# Patient Record
(demographics unavailable — no encounter records)

---

## 2017-03-21 NOTE — EMERGENCY ROOM REPORT
History of Present Illness


General


Chief Complaint:  Upper Respiratory Illness


Source:  Patient


 (Shonna Mcclellan)





Present Illness


HPI


72-year-old female presents to emergency Department complaining of productive 

cough over 2 months.  Patient states that approximately a month and a half ago 

she was prescribed antibiotics however now she states she began that fevers and 

chills over the course of the past 2 days.  Patient states that she was 

diagnosed previously with bronchitis she denies recent travel and reports her 

only ill contact was her grandson.  Patient reports past medical history of 

hypertension denies history of asthma, COPD, smoking, cardiac disease or renal 

disease.  She denies swelling in the lower extremities.  Patient states that 

yesterday she also felt weaker than normal.  Patient denies fall, dizziness, 

dysuria, hematuria, abdominal pain, rashes.  Denies sore throat or runny nose.  

Denies CP, Palpitations, LOC, AMS, dizziness, Changes in Vision, Sensation, 

paresthesias, or a sudden severe headache.


 (Shonna Mcclellan)


Allergies:  


Coded Allergies:  


     No Known Allergies (Unverified , 16)





Patient History


Past Medical History:  see triage record, HTN


Past Surgical History:  none


Pertinent Family History:  none


Last Menstrual Period:  na


Pregnant Now:  No


Immunizations:  UTD


Reviewed Nursing Documentation:  PMH: Agreed, PSxH: Agreed (Shonna Mcclellan)





Nursing Documentation-PMH


Past Medical History:  No History, Except For


 (Shonna Mcclellan)





Review of Systems


All Other Systems:  negative except mentioned in HPI


 (Shonna Mcclellan)





Physical Exam





Vital Signs








  Date Time  Temp Pulse Resp B/P Pulse Ox O2 Delivery O2 Flow Rate FiO2


 


3/21/17 13:47 100.6 106 20 102/63 98 Room Air  








Sp02 EP Interpretation:  reviewed, abnormal - fever 100.6, tachycardic 106 BPM


General Appearance:  no apparent distress, alert, GCS 15, non-toxic


Head:  normocephalic, atraumatic


Eyes:  bilateral eye PERRL, bilateral eye normal inspection


ENT:  hearing grossly normal, normal pharynx, no angioedema, normal voice


Neck:  full range of motion, supple/symm/no masses


Respiratory:  chest non-tender, crackles - inspiratory crackles auscultated at 

the base of the right lung, left lung fields are CTA, speaking full sentences


Cardiovascular #1:  regular rate, rhythm, no edema, normal capillary refill, 

tachycardia - 106BPM


Gastrointestinal:  soft


Rectal:  deferred


Genitourinary:  normal inspection, no CVA tenderness


Musculoskeletal:  back normal, gait/station normal, normal range of motion, non-

tender, no calf tenderness


Neurologic:  alert, oriented x3, responsive, motor strength/tone normal, 

sensory intact, speech normal


Psychiatric:  judgement/insight normal, memory normal, mood/affect normal, no 

suicidal/homicidal ideation


Skin:  normal color, no rash, warm/dry, well hydrated


Lymphatic:  no adenopathy


 (Shonna Mcclellan)





Medical Decision Making


PA Attestation


Dr. Saenz  is my supervising Physician whom patient management has been 

discussed with. 


 (Shonna Mcclellan)


Diagnostic Impression:  


 Primary Impression:  


 Pneumonia


 Qualified Codes:  J18.9 - Pneumonia, unspecified organism


 Additional Impression:  


 Anemia


 Qualified Codes:  D64.9 - Anemia, unspecified


ER Course


72-year-old female presents to emergency Department complaining of productive 

cough over 2 months.  Patient states that approximately a month and a half ago 

she was prescribed antibiotics however now she states she began that fevers and 

chills over the course of the past 2 days.  Patient states that she was 

diagnosed previously with bronchitis she denies recent travel and reports her 

only ill contact was her grandson.  Patient reports past medical history of 

hypertension denies history of asthma, COPD, smoking, cardiac disease or renal 

disease.  She denies swelling in the lower extremities.  Patient states that 

yesterday she also felt weaker than normal.  Patient denies fall, dizziness, 

dysuria, hematuria, abdominal pain, rashes.  Denies sore throat or runny nose.





Ddx considered but are not limited to URI, pneumonia, PE, pulmonary edema, 

bronchitis





Vital signs: Pt. has fever of 100.6, tachycardic 106 bpm


H&PE are most consistent with possible PNA, inspiratory crackles auscultated at 

the base of the right lung, left lung fields are CTA. 





ORDERS: 


-CXR 1 View:  Bilateral pulmonary infiltrates, no atelectasis, no effusions per 

preliminary read by Dr. Saenz


-CBC: WBC's elevated 10.9, hemoglobin 10.9,  rbc's 3.5


-BMP: mild hypochloremia and hyponatremia , Cr 1.1


-UA: pending


-Pro-BNP: 985


-Lactic Acid: pending


-Blood Cultures x 2: Pending


-EK BPM NSR, no Acute ST changes- interpreted by Dr. Saenz


 


ED INTERVENTIONS: 


-Pt placed on cardiac monitoring


-IV Access established


-3grams Unasyn IVPB 








DISPOSITION: at this time pt. will be admitted to Dr. Rocha for Pneumonia.


Dr. Rocha  agreed to admit the pt. and to continue pt. care management.- 

Endorsed to Dr. Rocha at 1547





Labs








Test


  3/21/17


14:24 3/21/17


15:19 3/21/17


15:34


 


White Blood Count


  10.9 K/UL


(4.8-10.8) 


  


 


 


Red Blood Count


  3.56 M/UL


(4.20-5.40) 


  


 


 


Hemoglobin


  10.9 G/DL


(12.0-16.0) 


  


 


 


Hematocrit


  32.7 %


(37.0-47.0) 


  


 


 


Mean Corpuscular Volume 92 FL (80-99)   


 


Mean Corpuscular Hemoglobin


  30.7 PG


(27.0-31.0) 


  


 


 


Mean Corpuscular Hemoglobin


Concent 33.5 G/DL


(32.0-36.0) 


  


 


 


Red Cell Distribution Width


  11.8 %


(11.6-14.8) 


  


 


 


Platelet Count


  311 K/UL


(150-450) 


  


 


 


Mean Platelet Volume


  6.2 FL


(6.5-10.1) 


  


 


 


Neutrophils (%) (Auto)  % (45.0-75.0)   


 


Lymphocytes (%) (Auto)  % (20.0-45.0)   


 


Monocytes (%) (Auto)  % (1.0-10.0)   


 


Eosinophils (%) (Auto)  % (0.0-3.0)   


 


Basophils (%) (Auto)  % (0.0-2.0)   


 


Differential Total Cells


Counted 100 


  


  


 


 


Neutrophils % (Manual) 91 % (45-75)   


 


Lymphocytes % (Manual) 5 % (20-45)   


 


Monocytes % (Manual) 3 % (1-10)   


 


Eosinophils % (Manual) 0 % (0-3)   


 


Basophils % (Manual) 0 % (0-2)   


 


Band Neutrophils 1 % (0-8)   


 


Platelet Estimate Adequate   


 


Platelet Morphology Normal   


 


Red Blood Cell Morphology Normal   


 


Sodium Level


  132 mEQ/L


(135-145) 


  


 


 


Potassium Level


  3.9 mEQ/L


(3.4-4.9) 


  


 


 


Chloride Level


  95 mEQ/L


() 


  


 


 


Carbon Dioxide Level


  22 mEQ/L


(20-30) 


  


 


 


Anion Gap 15 (5-15)   


 


Blood Urea Nitrogen


  20 mg/dL


(7-23) 


  


 


 


Creatinine


  1.1 mg/dL


(0.5-0.9) 


  


 


 


Estimat Glomerular Filtration


Rate  mL/min (>60) 


  


  


 


 


Glucose Level


  140 mg/dL


() 


  


 


 


Calcium Level


  8.6 mg/dL


(8.6-10.2) 


  


 


 


Pro-B-Type Natriuretic Peptide


  985 pg/mL


(0-125) 


  


 








 (Shonna Mcclellan)


ER Course


The patient was seen by physician assistant.  Patient noted have evidence of 

temporal muscle wasting.  Patient's chest x-ray showed bilateral l lung 

infiltrate.  Patient was given IV antibiotics.  Patient started on IV fluids.  

Patient appears to have been losing weight according to her family.  This is 

reportedly do to a recent death of her son.Chest x-ray read by radiology showed 

bilateral diffuse interstitial disease with indeterminate acuity.





Labs








Test


  3/21/17


14:24


 


White Blood Count


  10.9 K/UL


(4.8-10.8)


 


Red Blood Count


  3.56 M/UL


(4.20-5.40)


 


Hemoglobin


  10.9 G/DL


(12.0-16.0)


 


Hematocrit


  32.7 %


(37.0-47.0)


 


Mean Corpuscular Volume 92 FL (80-99) 


 


Mean Corpuscular Hemoglobin


  30.7 PG


(27.0-31.0)


 


Mean Corpuscular Hemoglobin


Concent 33.5 G/DL


(32.0-36.0)


 


Red Cell Distribution Width


  11.8 %


(11.6-14.8)


 


Platelet Count


  311 K/UL


(150-450)


 


Mean Platelet Volume


  6.2 FL


(6.5-10.1)


 


Neutrophils (%) (Auto)  % (45.0-75.0) 


 


Lymphocytes (%) (Auto)  % (20.0-45.0) 


 


Monocytes (%) (Auto)  % (1.0-10.0) 


 


Eosinophils (%) (Auto)  % (0.0-3.0) 


 


Basophils (%) (Auto)  % (0.0-2.0) 


 


Differential Total Cells


Counted 100 


 


 


Neutrophils % (Manual) 91 % (45-75) 


 


Lymphocytes % (Manual) 5 % (20-45) 


 


Monocytes % (Manual) 3 % (1-10) 


 


Eosinophils % (Manual) 0 % (0-3) 


 


Basophils % (Manual) 0 % (0-2) 


 


Band Neutrophils 1 % (0-8) 


 


Platelet Estimate Adequate 


 


Platelet Morphology Normal 


 


Red Blood Cell Morphology Normal 


 


Sodium Level


  132 mEQ/L


(135-145)


 


Potassium Level


  3.9 mEQ/L


(3.4-4.9)


 


Chloride Level


  95 mEQ/L


()


 


Carbon Dioxide Level


  22 mEQ/L


(20-30)


 


Anion Gap 15 (5-15) 


 


Blood Urea Nitrogen


  20 mg/dL


(7-23)


 


Creatinine


  1.1 mg/dL


(0.5-0.9)


 


Estimat Glomerular Filtration


Rate  mL/min (>60) 


 


 


Glucose Level


  140 mg/dL


()


 


Calcium Level


  8.6 mg/dL


(8.6-10.2)








 (Tian Saenz)


EKG Diagnostic Results


EP Interpretation:  interpreted by Dr. Saenz


Rate:  normal - 97 BPM


Rhythm:  NSR


ST Segments:  no acute changes


ASA given to the pt in ED:  No


PA Scribe Text


interpreted by Dr. Saenz


 (Shonna Mcclellan P.AHarsha)


Rate:  normal


Rhythm:  NSR


ST Segments:  no acute changes


 (Tian Saenz)





Last Vital Signs








  Date Time  Temp Pulse Resp B/P Pulse Ox O2 Delivery O2 Flow Rate FiO2


 


3/21/17 13:47 100.6 106 20 102/63 98 Room Air  








 (Shonna Mcclellan P.AHarsha)


Status:  unchanged


 (Tian Saenz)


Disposition:  ADMITTED AS INPATIENT


Condition:  Serious


Referrals:  


NON PHYSICIAN (PCP)











Shonna Mcclellan Mar 21, 2017 14:12


Tian Saenz Mar 21, 2017 15:24

## 2017-03-22 NOTE — CONSULTATION
DATE OF CONSULTATION:  03/22/2017



PULMONARY CONSULTATION



CHIEF COMPLAINT:  Short of breath and cough with blood-streaked

sputum.



HISTORY OF PRESENT ILLNESS:  This 72-year-old woman was treated

several weeks ago with antibiotics for cough and shortness of breath.  No

x-rays available from that visit; however, the patient did not improve and

was recently given antibiotics again.  She became more short of breath and

came to the emergency department.  She has no high fever.  She is coughing

some yellow sputum with blood streaks.  She has no history of asthma,

pneumonia, or tuberculosis.  She has never smoked cigarettes.



PAST MEDICAL HISTORY:  Hypertension, history of osteoporosis, and

possible rheumatoid arthritis.



PAST SURGICAL HISTORY:  She has no history of surgery.



ALLERGIES:  None.



MEDICATIONS:  Reviewed.  She recently got ceftriaxone in the

emergency department.  At home, she is on aspirin, calcium, and Lyrica.



IMMUNIZATIONS:  She had a flu shot this year.



REVIEW OF SYSTEMS:  Otherwise unremarkable.  She has no chills or

sweats.  She is not losing weight.



PHYSICAL EXAMINATION:

GENERAL:  The patient is alert and responds appropriately.

VITAL SIGNS:  Stable.  She is somewhat hypotensive.  There is no

fever.

SKIN:  Warm and dry.

HEENT:  Head is normocephalic.

NECK:  No jugular venous distention.

CHEST:  Has rales bilateral.

CARDIAC:  Rhythm is regular.

ABDOMEN:  Soft and nontender.

EXTREMITIES:  No edema.



DIAGNOSTIC DATA:  Chest x-ray shows bilateral patchy infiltrates.



IMPRESSION:

1. Bilateral pulmonary infiltrates, possible pneumonia, possible

tuberculosis, possible rheumatoid lung disease.

2. History of hypertension with current hypotension.

3. Mild anemia.

4. Malnutrition with albumin 2.7.

5. Chronic kidney disease, stage 2.

6. Mild hyperglycemia.



PLAN:  The patient will be placed in respiratory isolation.  We will

get appropriate tuberculosis studies, antibiotics will be given, and we

will check rheumatoid factor and get a high-resolution CT scan of the

chest.









  ______________________________________________

  Juancarlos Urias M.D.





DR:  CEE

D:  03/22/2017 17:20

T:  03/22/2017 19:39

JOB#:  [id number]

CC:  Juancarlos Urias M.D.; Fax#:  [phone] Kattan, M.D.

## 2017-03-22 NOTE — DIAGNOSTIC IMAGING REPORT
Indication: COUGH



Technique: One view of the chest



Comparison: none



Findings: There is bilateral diffuse next mostly interstitial disease, 

acuity

indeterminate. The pleural spaces are clear.  Heart size is normal



Impression: Bilateral mixed mostly interstitial disease, acuity indeterminate.

## 2017-03-22 NOTE — HISTORY AND PHYSICAL REPORT
DATE OF ADMISSION:  03/21/2017



REASON FOR ADMISSION:  Community-acquired pneumonia.



HISTORY OF PRESENT ILLNESS:  This is a 72-year-old,  female,

presented to the emergency room with several weeks of progressive cough,

congestion and shortness of breath progressing more over the past few days

despite oral antibiotics.  The patient took a course of antibiotics almost

2 months ago, did not improve and because she developed fevers and chills.

In the last couple of days, additional antibiotics were given.  The

patient notes that her only ill contact with the grandson with a cold.

She has not had any recent travel history and there is no known history of

positive PPD.  There is no history of asthma, COPD, or other lung

disease.



PAST MEDICAL HISTORY:  Includes hypertension.



SOCIAL HISTORY:  Negative for smoking, alcohol, or substance abuse.



ALLERGIES:  None known.



MEDICATIONS:  Prior to admission, reviewed and reconciled.



FAMILY HISTORY:  Noncontributory.



REVIEW OF SYSTEMS:  She has had fevers and chills.  She did receive a

flu shot.  There is no history of seizure or stroke.  There is no known

history of diabetes or thyroid disorder.  She does have a history of

hypertension but no history of heart attack or irregular heartbeats.  No

history of rheumatic fever.  There is no history of asthma.  There is no

history of blood clotting or bleeding.  There is no known history of

kidney disease and she denies any change in bowel habits.



PHYSICAL EXAMINATION:

GENERAL:  Thin and frail, in no acute distress.

VITAL SIGNS:  Temperature 100.6 degrees, blood pressure 102/63, pulse

106, and respiratory 20.

HEENT:  Temporal wasting.  Pale conjunctivae.  Anicteric sclerae.

Oropharynx clear.  Mucous membranes dry.

NECK:  Supple.  No jugular venous distention.  No accessory muscle

use.

LUNGS:  Bilateral breath sounds.  Scattered rhonchi.  No wheezing.

CARDIAC:  Regular rhythm.  Rapid rate.  Normal S1 and S2 with a

fourth heart sound.  No murmur.

ABDOMEN:  Soft and nontender.  No guarding.  No rebound.

RECTAL:  Deferred.

NEUROLOGIC:  Nonfocal.

SKIN:  Without rash.



LABORATORY AND DIAGNOSTIC DATA:  White count 10.9 and hemoglobin

10.9.  Lactic acid is 1.2.  Pro-natriuretic peptide is 955.  BUN 20 and

creatinine 1.1.  Sodium 132, potassium 3.9, and bicarbonate 22.



Chest x-ray revealed bilateral interstitial infiltrates.  EKG sinus

rhythm, no acute abnormalities.



IMPRESSION:

1. Pneumonia.

2. Hypertension.

3. Anemia.

4. Hypovolemia.

5. Dehydration.



PLAN:

1. Antibiotics.

2. Respiratory hygiene.

3. Sputum cultures.

4. Empiric antibiotics.

5. DVT prophylaxis.

6. Volume resuscitation.

7. Repeat imaging studies of the lung.

8. We will follow.









  ______________________________________________

  Van Rocha M.D.





DR:  CARLOS

D:  03/22/2017 01:44

T:  03/22/2017 02:07

JOB#:  [id number]

CC:

## 2017-03-23 NOTE — DIAGNOSTIC IMAGING REPORT
Indication: Dyspnea



Technique: Continuous helical transaxial imaging of the chest was obtained from 

the

thoracic inlet to the upper abdomen. No intravenous contrast was administered.

Coronal 2-D reformats were also obtained.



Total Dose length Product (DLP):  485 mGycm



CT Dose Index Volume (CTDIvol):   15 mGy



Comparison: none



Findings: Extensive, patchy groundglass consolidative opacities are present

throughout the lung fields and both upper lower lobes. The single largest area 

of

involvement appears to be the right upper lobe. Findings likely due to 

disseminated

pneumonia. Please correlate clinically. Several circumscribed thin-walled cystic

foci also noted within the lungs. Nature of these structures is unknown but may be

small postinflammatory pneumatoceles. Heart size is normal. There is no pulmonary

venous congestion identified. There is no interstitial disease per se. Trace 

right

pleural effusion is present. Exam is limited by the absence of IV contrast 

material

especially with regard to evaluation for adenopathy which may be present in 

the

subcarinal and hilar regions. This is not for certain. Aorta shows some 

mural

calcification. The visualized part of the upper abdomen is unremarkable.



Impression:



Extensive patchy infiltrates bilaterally.



Suggestion of subcutaneous scanner pneumatoceles.



Question of a subcarinal lymphadenopathy.



Mild atherosclerotic vascular disease



The CT scanner at [facility] is accredited by the [employer] College 

of

Radiology and the scans are performed using protocols designed to limit 

radiation

exposure to as low as reasonably achievable to attain images of sufficient

resolution adequate for diagnostic evaluation.

## 2017-03-23 NOTE — PULMONOLOGY PROGRESS NOTE
Assessment/Plan


Assessment/Plan


1. Bilateral pulmonary infiltrates, possible pneumonia, tuberculosis or 

rheumatoid lung disease.


2. History of hypertension with current hypotension.


3. Mild anemia.


4. Malnutrition with albumin 2.7.


5. Chronic kidney disease, stage 2.


6. Mild hyperglycemia.





feels better


low grade fever


rales


CT with extensive infiltrates and pneumatoceles


await serology, TB tests


cont abx





Subjective


Constitutional:  Reports: fever


Respiratory:  Reports: productive cough


Allergies:  


Coded Allergies:  


     No Known Allergies (Unverified , 2/14/16)





Objective





Last 24 Hour Vital Signs








  Date Time  Temp Pulse Resp B/P Pulse Ox O2 Delivery O2 Flow Rate FiO2


 


3/23/17 16:00 98.2 95 20 89/50 94 Nasal Cannula 2.0 


 


3/23/17 15:48  87 20  99 Nasal Cannula 2.0 


 


3/23/17 15:38  96 18  99 Nasal Cannula 2.0 


 


3/23/17 11:25 98.1 87 18 89/51 96 Nasal Cannula 2.0 


 


3/23/17 11:24  84 20  97 Nasal Cannula 3.0 


 


3/23/17 11:10  91 18  97 Nasal Cannula 3.0 


 


3/23/17 08:00 97.5 68 20 88/50 96 Nasal Cannula 2.0 


 


3/23/17 08:00  116      


 


3/23/17 07:55  86 18  99 Nasal Cannula 2.0 


 


3/23/17 07:45  95 18  88 Room Air  


 


3/23/17 04:00 97.2 77 18 100/56 97 Nasal Cannula 2.0 


 


3/23/17 04:00  71      


 


3/23/17 03:12      Nasal Cannula  


 


3/23/17 03:12      Nasal Cannula  


 


3/23/17 00:00 99.0 75 18 98/57 96 Nasal Cannula 2.0 


 


3/23/17 00:00  84      


 


3/22/17 22:54      Nasal Cannula  


 


3/22/17 22:53      Nasal Cannula  


 


3/22/17 22:47 99.1       


 


3/22/17 20:00  90      


 


3/22/17 20:00 100.6 85 21 95/54 93 Nasal Cannula 2.0 


 


3/22/17 19:30      Nasal Cannula  


 


3/22/17 19:30      Nasal Cannula  

















Intake and Output  


 


 3/22/17 3/23/17





 19:00 07:00


 


Intake Total 940 ml 1250 ml


 


Balance 940 ml 1250 ml


 


  


 


Intake Oral 240 ml 450 ml


 


IV Total 700 ml 800 ml


 


# Voids 1 3


 


# Bowel Movements  1








General Appearance:  no acute distress


Respiratory/Chest:  crackles/rales


Cardiovascular:  normal rate





Microbiology








 Date/Time


Source Procedure


Growth Status


 


 


 3/21/17 15:19


Blood Blood Culture - Preliminary


NO GROWTH AFTER 24 HOURS Resulted


 


 3/21/17 15:09


Blood Blood Culture - Preliminary


NO GROWTH AFTER 24 HOURS Resulted


 


 3/21/17 15:34


Urine,Clean Catch Urine Culture - Preliminary


Mixed Gram Positive Organism Resulted








Laboratory Tests


3/23/17 04:25: M. tuberculosis Complex DNA (PCR) [Pending]


3/23/17 06:50: 


Anti-Nuclear Antibody Screen [Pending], TB Test (T-Spot) [Pending], TB Test Nil 

Control (T-Spot) [Pending], TB Test Panel A (T-Spot) [Pending], TB Test Panel B 

(T-Spot) [Pending], TB Test Positive Control (T-Spot) [Pending]





Current Medications








 Medications


  (Trade)  Dose


 Ordered  Sig/Naman


 Route


 PRN Reason  Start Time


 Stop Time Status Last Admin


Dose Admin


 


 Acetaminophen


  (Tylenol)  650 mg  Q4H  PRN


 ORAL


 Mild Pain (Pain Scale 1-3)  3/21/17 17:45


 4/20/17 17:44  3/21/17 19:07


 


 


 Albuterol/


 Ipratropium


  (DuoNeb


 0.5-3(2.5)mg/3ml)  3 ml  Q4HRT


 HHN


   3/21/17 18:30


 3/26/17 18:29  3/23/17 15:41


 


 


 Azithromycin 250


 mg  250 mg  Q24H


 ORAL


   3/23/17 18:00


 3/29/17 17:59   


 


 


 Ceftriaxone


 Sodium/Dextrose


  (Rocephin/D5W)  55 ml @ 


 110 mls/hr  Q24H


 IVPB


   3/22/17 19:00


 3/29/17 18:59  3/22/17 21:23


 


 


 Dextrose     STAT  PRN


 IV


 Hypoglycemia  3/21/17 20:45


 4/20/17 20:44   


 


 


 Guaifenesin/


 Codeine Phosphate


  (Robitussin with


 codeine)  5 ml  Q4H  PRN


 ORAL


 For Cough  3/21/17 17:45


 4/20/17 17:44   


 


 


 Heparin Sodium


  (Porcine)


  (Heparin 5000


 units/ml)  5,000 units  EVERY 12  HOURS


 SUBQ


   3/22/17 21:00


 4/21/17 20:59  3/23/17 09:07


 


 


 Sodium Chloride


  (Sodium Chloride


 1000ml bag)  1,000 ml @ 


 100 mls/hr  Q10H


 IV


   3/22/17 01:45


 4/21/17 01:44  3/23/17 09:04


 

















BONNIE PINA Mar 23, 2017 17:36

## 2017-03-24 NOTE — PROGRESS NOTE
DATE:  03/23/2017



INTERNAL MEDICINE PROGRESS NOTE



SUBJECTIVE:  The patient remains in respiratory isolation.  She is

not short of breath.  Cough has decreased.  She has low-grade fevers to

100.6.



OBJECTIVE:

VITAL SIGNS:  Blood pressure 89/50, heart rate 95, respiratory rate

20, and oxygen saturation is 94% to 99% on two liters.

LUNGS:  Bilateral breath sounds.  Few rales.

HEART:  Regular rhythm and rate.  Normal S1 and S2.

ABDOMEN:  Soft.



CT scan revealed extensive infiltrates and pneumatoceles.



IMPRESSION:

1. Pulmonary infiltrates, possible pneumonia, possible rheumatoid lung,

rule out tuberculosis.

2. Persistent low range blood pressure, asymptomatic.

3. Moderate protein-calorie malnutrition.

4. Anemia.

5. Chronic kidney disease.

6. Elevated sedimentation rate.



PLAN:

1. Await cultures.

2. Empiric antibiotics.

3. Respiratory hygiene.

4. Intravenous fluids.

5. Check cortisol level.

6. DVT prophylaxis.









  ______________________________________________

  Van Rocha M.D. DR:  DANIELA

D:  03/23/2017 23:59

T:  03/24/2017 02:24

JOB#:  [id number]

CC:

## 2017-03-24 NOTE — PULMONOLOGY PROGRESS NOTE
Assessment/Plan


Assessment/Plan


1. Bilateral pulmonary infiltrates, possible pneumonia, tuberculosis or 

rheumatoid lung disease.


2. History of hypertension with current hypotension.


3. Mild anemia.


4. Malnutrition with albumin 2.7.


5. Chronic kidney disease, stage 2.


6. Mild hyperglycemia.





feels better


AFB neg x 1


TB spot, DNA pdg


RA titer +; prob Rheumatoid lung with pneumonia


TB less likely


cont abx





Subjective


Constitutional:  Reports: no symptoms


Respiratory:  Reports: hemoptysis - better, productive cough


Allergies:  


Coded Allergies:  


     No Known Allergies (Unverified , 2/14/16)





Objective





Last 24 Hour Vital Signs








  Date Time  Temp Pulse Resp B/P Pulse Ox O2 Delivery O2 Flow Rate FiO2


 


3/24/17 15:54  98 16  99 Nasal Cannula 2.0 


 


3/24/17 15:40  109 18  98 Nasal Cannula 2.0 


 


3/24/17 12:01 98.1 85 15 100/57 99 Nasal Cannula  


 


3/24/17 11:58  97 16  99 Nasal Cannula 2.0 


 


3/24/17 11:48  86 16  98 Nasal Cannula 2.0 


 


3/24/17 08:20 97.7 103 18 91/50 98 Room Air  


 


3/24/17 07:46  96 16  99 Nasal Cannula 2.0 


 


3/24/17 07:36  98 16  98 Nasal Cannula 2.0 


 


3/24/17 04:00 97.3 99 18 114/65 97 Nasal Cannula 2.0 


 


3/24/17 03:00      Nasal Cannula 2.0 28


 


3/24/17 03:00      Nasal Cannula 2.0 28


 


3/24/17 00:00 98.8 97 20 104/63 96 Nasal Cannula 2.0 


 


3/23/17 23:07      Nasal Cannula 2.0 28


 


3/23/17 23:06      Nasal Cannula 2.0 28


 


3/23/17 20:00 97.7 90 18 91/48 96 Nasal Cannula 2.0 


 


3/23/17 19:00  89 14  99 Nasal Cannula 2.0 28


 


3/23/17 19:00      Nasal Cannula 2.0 28

















Intake and Output  


 


 3/23/17 3/24/17





 19:00 07:00


 


Intake Total 840 ml 570 ml


 


Balance 840 ml 570 ml


 


  


 


Intake Oral 240 ml 240 ml


 


IV Total 600 ml 330 ml


 


# Voids 2 3


 


# Bowel Movements 1 








General Appearance:  no acute distress


Respiratory/Chest:  crackles/rales


Cardiovascular:  normal rate





Microbiology








 Date/Time


Source Procedure


Growth Status


 


 


 3/23/17 04:25


Sputum AFB Specimen Processing Tissue - Final Resulted





 3/23/17 04:25


Sputum Acid Fast Bacilli Smear - Final Resulted


 


 3/23/17 04:25


Sputum Acid Fast Bacilli Culture


Pending Resulted





 3/22/17 14:50


Sputum Gram Stain - Final Resulted


 


 3/22/17 14:50 Sputum Culture - Preliminary


Candida Albicans Resulted








Laboratory Tests


3/24/17 06:35: 


White Blood Count 5.3, Red Blood Count 2.74L, Hemoglobin 8.3L, Hematocrit 25.6L

, Mean Corpuscular Volume 93, Mean Corpuscular Hemoglobin 30.4, Mean 

Corpuscular Hemoglobin Concent 32.6, Red Cell Distribution Width 11.9, Platelet 

Count 252, Mean Platelet Volume 6.3L, Neutrophils (%) (Auto) 74.0, Lymphocytes (

%) (Auto) 15.5L, Monocytes (%) (Auto) 9.7, Eosinophils (%) (Auto) 0.5, 

Basophils (%) (Auto) 0.3, Sodium Level 139, Potassium Level 3.6, Chloride Level 

103, Carbon Dioxide Level 22, Anion Gap 14, Blood Urea Nitrogen 9, Creatinine 

0.8, Estimat Glomerular Filtration Rate , Glucose Level 102, Calcium Level 8.3L

, Magnesium Level 1.8, Total Bilirubin 0.4, Aspartate Amino Transf (AST/SGOT) 16

, Alanine Aminotransferase (ALT/SGPT) 6, Alkaline Phosphatase 67, Total Protein 

6.3L, Albumin 2.5L, Globulin 3.8, Albumin/Globulin Ratio 0.6L, Cortisol [Pending

]





Current Medications








 Medications


  (Trade)  Dose


 Ordered  Sig/Naman


 Route


 PRN Reason  Start Time


 Stop Time Status Last Admin


Dose Admin


 


 Acetaminophen


  (Tylenol)  650 mg  Q4H  PRN


 ORAL


 Mild Pain (Pain Scale 1-3)  3/23/17 21:45


 4/22/17 21:44   


 


 


 Albuterol/


 Ipratropium 3 ml  3 ml  Q4HRT


 HHN


   3/23/17 19:00


 3/28/17 18:59  3/24/17 15:43


 


 


 Azithromycin


  (Zithromax)  250 mg  Q24H


 ORAL


   3/24/17 18:00


 3/28/17 17:59   


 


 


 Ceftriaxone


 Sodium/Dextrose


  (Rocephin/D5W)  55 ml @ 


 110 mls/hr  Q24H


 IVPB


   3/23/17 19:00


 3/29/17 18:59  3/23/17 19:35


 


 


 Dextrose


  (Dextrose 50%)    STAT  PRN


 IV


 Hypoglycemia  3/23/17 20:45


 4/22/17 20:44   


 


 


 Guaifenesin/


 Codeine Phosphate


  (Robitussin with


 codeine)  5 ml  Q4H  PRN


 ORAL


 For Cough  3/23/17 21:45


 4/22/17 21:44   


 


 


 Heparin Sodium


  (Porcine)


  (Heparin 5000


 units/ml)  5,000 units  EVERY 12  HOURS


 SUBQ


   3/23/17 21:00


 4/22/17 20:59  3/24/17 09:33


 


 


 Sodium Chloride


  (Sodium Chloride


 1000ml bag)  1,000 ml @ 


 75 mls/hr  [id number]


 IV


   3/24/17 19:00


 4/23/17 18:59  3/24/17 04:44


 

















BONNIE PINA 24, 2017 16:08

## 2017-03-24 NOTE — PROGRESS NOTE
DATE:  03/22/2017



INTERNAL MEDICINE PROGRESS NOTE



LATE ENTRY



SUBJECTIVE:  The patient was seen and evaluated.  Case was reviewed

with the pulmonologist, Dr. Urias.  The patient is less congested, but

still has cough.  She had low blood pressure readings all evening and

required fluid boluses.



OBJECTIVE:

VITAL SIGNS:  Blood pressure 85/47, pulse 98, respiratory rate 19,

afebrile, and oxygen saturation on 2 L 99%.

HEENT:  Oropharynx clear with no thrush.

NECK:  Supple with no adenopathy.

LUNGS:  With rales bilaterally.

CARDIAC:  Regular rhythm and rate.  Normal S1 and S2 with no

murmur.

EXTREMITIES:  No edema.

ABDOMEN:  Soft and benign.



IMPRESSION:

1. Pulmonary infiltrates, possible pneumonia.

2. Hypertension, now with low heart rate.  Blood pressure suggesting

shock.

3. Anemia.

4. Moderate protein-calorie malnutrition.

5. Chronic kidney disease.



PLAN:  Antimicrobials.  Respiratory isolation.  Pending tuberculosis

studies.  CT scan of the chest _____ pulmonologist.  Protein supplement.

Volume support.  Holding of any antihypertensive therapy at this time.  No

need for pressors presently.









  ______________________________________________

  Van Rocha M.D. DR:  ALEXANDRE

D:  03/23/2017 23:56

T:  03/24/2017 01:57

JOB#:  [id number]

CC:

## 2017-03-25 NOTE — PROGRESS NOTE
DATE:  03/24/2017



SUBJECTIVE:  The patient is less short of breath, but still quite

weak.



OBJECTIVE:

VITAL SIGNS:  Blood pressure 91/50 to 114/65, heart rate 86 to 109,

and respiratory rate 16 to 18.  The patient remains afebrile.  Oxygen

saturation 98 to 99% on 2 liters.

LUNGS:  Coarse rhonchi.

CARDIAC:  Regular rhythm and rate.  Normal S1 and S2.

ABDOMEN:  Soft.

EXTREMITIES:  No edema.



LABORATORY DATA:  TB test pending.  AFB smear negative x1.  RA titer

positive.



IMPRESSION:

1. 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

2. Hypovolemic shock, recovering.

3. Severe protein-calorie malnutrition.

4. Chronic kidney disease.



LABORATORY DATA:  White count 5.3 and hemoglobin 8.3.  Potassium 3.6,

BUN 9, creatinine 0.8, and albumin 2.5.  _____ pending.



PLAN:

1. Protein supplement.

2. Empiric antibiotics.

3. Await final cultures.

4. Anemia panel.

5. Volume support.

6. Mobilize.









  ______________________________________________

  Van Rocha M.D.





DR:  MYRNA

D:  03/24/2017 23:56

T:  03/25/2017 02:42

JOB#:  [id number]

CC:

## 2017-03-25 NOTE — PULMONOLOGY PROGRESS NOTE
Assessment/Plan


Assessment/Plan


1. Bilateral pulmonary infiltrates, possible pneumonia, tuberculosis or 

rheumatoid lung disease.


2. History of hypertension with current hypotension.


3. Mild anemia.


4. Malnutrition with albumin 2.7.


5. Chronic kidney disease, stage 2.


6. Mild hyperglycemia.





continues to feels better


AFB neg x 2


TB spot, DNA pdg


RA titer +; prob Rheumatoid lung with pneumonia


TB less likely


cont abx


CXR monday





Subjective


Constitutional:  Reports: no symptoms


Respiratory:  Reports: dry cough, shortness of breath


Cardiovascular:  Reports: no symptoms


Gastrointestinal/Abdominal:  Reports: no symptoms


Genitourinary:  Reports: no symptoms


Allergies:  


Coded Allergies:  


     No Known Allergies (Unverified , 2/14/16)


Subjective


no new events


 no cp nv 


scant hemoptysis noted by patient


tolerating po


remains on supple o2


no fever noted


oob


still weak





Objective





Last 24 Hour Vital Signs








  Date Time  Temp Pulse Resp B/P Pulse Ox O2 Delivery O2 Flow Rate FiO2


 


3/25/17 16:28 99.5 99 16 117/66 98 Nasal Cannula  


 


3/25/17 14:43  92 16  99 Nasal Cannula 2.0 28


 


3/25/17 14:38  97 16  99 Nasal Cannula 2.0 28


 


3/25/17 14:38        28


 


3/25/17 11:30 99.9 98 16 100/54 98 Room Air  


 


3/25/17 11:20  88 16  98 Nasal Cannula 2.0 28


 


3/25/17 11:15  85 16  97 Nasal Cannula 2.0 28


 


3/25/17 11:15        28


 


3/25/17 08:21 97.9 109 18 90/51 100 Nasal Cannula  


 


3/25/17 07:30        28


 


3/25/17 07:30  90 16  95 Nasal Cannula 2.0 28


 


3/25/17 07:30  85 16  99 Nasal Cannula 2.0 28


 


3/25/17 04:00 99.3 90 20 95/44 94 Nasal Cannula 2.0 


 


3/25/17 03:14      Nasal Cannula 2.0 28


 


3/25/17 03:13      Nasal Cannula 2.0 28


 


3/25/17 00:11 99.7       


 


3/25/17 00:00 99.7 117 18 100/54 96 Nasal Cannula 2.0 


 


3/24/17 23:35  98 16  100 Nasal Cannula 2.0 


 


3/24/17 23:35  106 18  97 Nasal Cannula 2.0 


 


3/24/17 23:13 100.4       


 


3/24/17 20:02  93 18  96 Nasal Cannula 2.0 


 


3/24/17 20:02  92 16  99 Nasal Cannula 2.0 


 


3/24/17 20:00 101.1 92 18 113/61 96 Room Air  

















Intake and Output  


 


 3/24/17 3/25/17





 19:00 07:00


 


Intake Total 1375 ml 1277.5 ml


 


Balance 1375 ml 1277.5 ml


 


  


 


Intake Oral 1000 ml 360 ml


 


IV Total 375 ml 917.5 ml


 


# Voids 2 5








General Appearance:  WD/WN


HEENT:  atraumatic


Respiratory/Chest:  lungs clear, no respiratory distress


Cardiovascular:  normal peripheral pulses, regular rhythm


Abdomen:  soft, non tender, no organomegaly


Extremities:  no cyanosis


Skin:  no rash


Neurologic/Psychiatric:  abnormal gait, alert, oriented x 3


Lymphatic:  no neck adenopathy





Microbiology








 Date/Time


Source Procedure


Growth Status


 


 


 3/24/17 05:00


Sputum AFB Specimen Processing Tissue - Final Resulted





 3/24/17 05:00


Sputum Acid Fast Bacilli Smear - Final Resulted


 


 3/24/17 05:00


Sputum Acid Fast Bacilli Culture


Pending Resulted





 3/23/17 04:25


Sputum AFB Specimen Processing Tissue - Final Resulted





 3/23/17 04:25


Sputum Acid Fast Bacilli Smear - Final Resulted


 


 3/23/17 04:25


Sputum Acid Fast Bacilli Culture


Pending Resulted








Laboratory Tests


3/25/17 01:15: M. tuberculosis Complex DNA (PCR) [Pending]


3/25/17 07:20: 


White Blood Count 6.5, Red Blood Count 2.88L, Hemoglobin 8.6L, Hematocrit 27.0L

, Mean Corpuscular Volume 94, Mean Corpuscular Hemoglobin 30.0, Mean 

Corpuscular Hemoglobin Concent 32.0, Red Cell Distribution Width 12.2, Platelet 

Count 267, Mean Platelet Volume 6.1L, Neutrophils (%) (Auto) 68.9, Lymphocytes (

%) (Auto) 20.8, Monocytes (%) (Auto) 9.5, Eosinophils (%) (Auto) 0.3, Basophils 

(%) (Auto) 0.4, Iron Level 27L, Total Iron Binding Capacity 178L, Percent Iron 

Saturation 15, Unsaturated Iron Binding 151, Vitamin B12 Level 664, Folate [

Pending]


3/25/17 09:50: Stool Occult Blood [Pending]





Current Medications








 Medications


  (Trade)  Dose


 Ordered  Sig/Naman


 Route


 PRN Reason  Start Time


 Stop Time Status Last Admin


Dose Admin


 


 Acetaminophen


  (Tylenol)  650 mg  Q4H  PRN


 ORAL


 Mild Pain (Pain Scale 1-3)  3/23/17 21:45


 4/22/17 21:44  3/24/17 23:12


 


 


 Albuterol/


 Ipratropium 3 ml  3 ml  Q4HRT


 HHN


   3/23/17 19:00


 3/28/17 18:59  3/25/17 14:37


 


 


 Azithromycin


  (Zithromax)  250 mg  Q24H


 ORAL


   3/24/17 18:00


 3/28/17 17:59  3/24/17 19:49


 


 


 Ceftriaxone


 Sodium/Dextrose


  (Rocephin/D5W)  55 ml @ 


 110 mls/hr  Q24H


 IVPB


   3/23/17 19:00


 3/29/17 18:59  3/24/17 19:49


 


 


 Dextrose


  (Dextrose 50%)    STAT  PRN


 IV


 Hypoglycemia  3/23/17 20:45


 4/22/17 20:44   


 


 


 Guaifenesin/


 Codeine Phosphate


  (Robitussin with


 codeine)  5 ml  Q4H  PRN


 ORAL


 For Cough  3/23/17 21:45


 4/22/17 21:44   


 


 


 Heparin Sodium


  (Porcine)


  (Heparin 5000


 units/ml)  5,000 units  EVERY 12  HOURS


 SUBQ


   3/23/17 21:00


 4/22/17 20:59  3/25/17 09:19


 


 


 Sodium Chloride


  (Sodium Chloride


 1000ml bag)  1,000 ml @ 


 75 mls/hr  [id number]


 IV


   3/24/17 19:00


 4/23/17 18:59  3/25/17 09:20


 

















OLGA BECK DO Mar 25, 2017 17:23

## 2017-03-26 NOTE — PULMONOLOGY PROGRESS NOTE
Assessment/Plan


Assessment/Plan


1. Bilateral pulmonary infiltrates, possible pneumonia,  rheumatoid lung 

disease doubt tuberculosis .


2. History of hypertension with current hypotension.


3. Mild anemia.


4. Malnutrition with albumin 2.7.


5. Chronic kidney disease, stage 2.


6. Mild hyperglycemia.





continues to feels better


AFB neg x 3, can dc isolation


TB spot, DNA pdg


RA titer +; prob Rheumatoid lung with pneumonia


TB less likely


cont abx


CXR monday





Subjective


Constitutional:  Reports: no symptoms


HEENT:  Repors: no symptoms


Respiratory:  Reports: no symptoms


Cardiovascular:  Reports: no symptoms


Gastrointestinal/Abdominal:  Reports: no symptoms


Neurologic:  Reports: no symptoms


Skin:  Reports: no symptoms


Hematologic:  Reports: no symptoms


Allergies:  


Coded Allergies:  


     No Known Allergies (Unverified , 2/14/16)


Subjective


no new events


 no cp nv 


no  hemoptysis noted by patient


tolerating po


remains on supple o2


no fever noted


oob


still weak


afb negative 3





Objective





Last 24 Hour Vital Signs








  Date Time  Temp Pulse Resp B/P Pulse Ox O2 Delivery O2 Flow Rate FiO2


 


3/26/17 16:00 99.9 86 20 122/65 97 Nasal Cannula 2.0 


 


3/26/17 15:05      Nasal Cannula 2.0 28


 


3/26/17 15:05      Nasal Cannula 2.0 28


 


3/26/17 11:55 99.1 83 22 106/62 97 Room Air  


 


3/26/17 11:15      Nasal Cannula 2.0 28


 


3/26/17 11:15      Nasal Cannula 2.0 28


 


3/26/17 08:20 97.9 89 20 111/67 97 Room Air  


 


3/26/17 07:20  89 16  99 Nasal Cannula 2.0 28


 


3/26/17 07:20  94 16  96 Nasal Cannula 2.0 28


 


3/26/17 04:00 97.6 76 17 106/62 100 Room Air  


 


3/26/17 02:34      Nasal Cannula  


 


3/26/17 02:30      Nasal Cannula  


 


3/26/17 00:00 97.0 80 16 110/59 100 Room Air  


 


3/25/17 23:00      Nasal Cannula  


 


3/25/17 20:29  89 16  99 Nasal Cannula 2.0 28


 


3/25/17 20:26  92 16  98 Nasal Cannula 2.0 28

















Intake and Output  


 


 3/25/17 3/26/17





 19:00 07:00


 


Intake Total 1887.5 ml 375 ml


 


Balance 1887.5 ml 375 ml


 


  


 


Intake Oral 1450 ml 


 


IV Total 437.5 ml 375 ml


 


# Voids 2 4


 


# Bowel Movements 2 








General Appearance:  WD/WN


HEENT:  atraumatic, anicteric


Respiratory/Chest:  lungs clear, normal breath sounds


Cardiovascular:  normal rate, regularly irregular


Abdomen:  soft, non tender, no organomegaly


Extremities:  no clubbing


Neurologic/Psychiatric:  abnormal gait, oriented x 3





Microbiology








 Date/Time


Source Procedure


Growth Status


 


 


 3/25/17 01:15


Sputum Not Specified AFB Specimen Processing Tissue - Final Resulted





 3/25/17 01:15


Sputum Not Specified Acid Fast Bacilli Smear - Final Resulted


 


 3/25/17 01:15


Sputum Not Specified Acid Fast Bacilli Culture


Pending Resulted





 3/24/17 05:00


Sputum AFB Specimen Processing Tissue - Final Resulted





 3/24/17 05:00


Sputum Acid Fast Bacilli Smear - Final Resulted


 


 3/24/17 05:00


Sputum Acid Fast Bacilli Culture


Pending Resulted











Current Medications








 Medications


  (Trade)  Dose


 Ordered  Sig/Naman


 Route


 PRN Reason  Start Time


 Stop Time Status Last Admin


Dose Admin


 


 Acetaminophen


  (Tylenol)  650 mg  Q4H  PRN


 ORAL


 Mild Pain (Pain Scale 1-3)  3/23/17 21:45


 4/22/17 21:44  3/24/17 23:12


 


 


 Albuterol/


 Ipratropium


  (DuoNeb


 0.5-3(2.5)mg/3ml)  3 ml  Q4HRT


 HHN


   3/23/17 19:00


 3/28/17 18:59  3/26/17 07:20


 


 


 Azithromycin


  (Zithromax)  250 mg  Q24H


 ORAL


   3/24/17 18:00


 3/28/17 17:59  3/26/17 19:34


 


 


 Ceftriaxone


 Sodium/Dextrose


  (Rocephin/D5W)  55 ml @ 


 110 mls/hr  Q24H


 IVPB


   3/23/17 19:00


 3/29/17 18:59  3/26/17 19:34


 


 


 Dextrose


  (Dextrose 50%)    STAT  PRN


 IV


 Hypoglycemia  3/23/17 20:45


 4/22/17 20:44   


 


 


 Guaifenesin/


 Codeine Phosphate


  (Robitussin with


 codeine)  5 ml  Q4H  PRN


 ORAL


 For Cough  3/23/17 21:45


 4/22/17 21:44   


 


 


 Heparin Sodium


  (Porcine)


  (Heparin 5000


 units/ml)  5,000 units  EVERY 12  HOURS


 SUBQ


   3/23/17 21:00


 4/22/17 20:59  3/26/17 08:32


 











Current Medications








 Medications


  (Trade)  Dose


 Ordered  Sig/Naman


 Route


 PRN Reason  Start Time


 Stop Time Status Last Admin


Dose Admin


 


 Acetaminophen


  (Tylenol)  650 mg  Q4H  PRN


 ORAL


 Mild Pain (Pain Scale 1-3)  3/23/17 21:45


 4/22/17 21:44  3/24/17 23:12


 


 


 Albuterol/


 Ipratropium


  (DuoNeb


 0.5-3(2.5)mg/3ml)  3 ml  Q4HRT


 HHN


   3/23/17 19:00


 3/28/17 18:59  3/26/17 07:20


 


 


 Azithromycin


  (Zithromax)  250 mg  Q24H


 ORAL


   3/24/17 18:00


 3/28/17 17:59  3/26/17 19:34


 


 


 Ceftriaxone


 Sodium/Dextrose


  (Rocephin/D5W)  55 ml @ 


 110 mls/hr  Q24H


 IVPB


   3/23/17 19:00


 3/29/17 18:59  3/26/17 19:34


 


 


 Dextrose


  (Dextrose 50%)    STAT  PRN


 IV


 Hypoglycemia  3/23/17 20:45


 4/22/17 20:44   


 


 


 Guaifenesin/


 Codeine Phosphate


  (Robitussin with


 codeine)  5 ml  Q4H  PRN


 ORAL


 For Cough  3/23/17 21:45


 4/22/17 21:44   


 


 


 Heparin Sodium


  (Porcine)


  (Heparin 5000


 units/ml)  5,000 units  EVERY 12  HOURS


 SUBQ


   3/23/17 21:00


 4/22/17 20:59  3/26/17 08:32


 

















OLGA BECK DO Mar 26, 2017 20:25

## 2017-03-26 NOTE — CARDIOLOGY REPORT
--------------- APPROVED REPORT --------------





EKG Measurement

Heart [id number]

NE [id number]

[id number]

[id number]

[id number]





Normal sinus rhythm

Normal ECG

## 2017-03-26 NOTE — PROGRESS NOTE
DATE:  03/25/2017



INTERNAL MEDICINE PROGRESS NOTE



SUBJECTIVE:  The patient feels better.  No chest pain.  Less short of

breath.  AFB smear is negative x2.  _____ pending.



OBJECTIVE:

VITAL SIGNS:  Blood pressure 117/66, pulse 99, respiratory rate 16,

and temperature 99.9, max.  Oxygen saturation on 2 liters 98%.

LUNGS:  Bilateral breath sounds.  Few rhonchi.

HEART:  Regular rhythm and rate.  Normal S1 and S2.

ABDOMEN:  Soft.  No edema.



LABORATORY DATA:  White count 6.5 and hemoglobin 8.6.  B12 664.

Cortisol normal.  Iron panel consistent with chronic disease.



IMPRESSION:

1. Pneumonia.

2. Hypoxia.

3. Possible rheumatoid lung.

4. Chronic anemia.



PLAN:

1. Continue antibiotics.

2. Await final cultures.

3. Mobilize.

4. Follow up chest x-ray.









  ______________________________________________

  Van Rocha M.D.





DR:  YVROSE

D:  03/26/2017 00:54

T:  03/26/2017 04:08

JOB#:  [id number]

CC:

## 2017-03-27 NOTE — PROGRESS NOTE
DATE:  03/26/2017



INTERNAL MEDICINE PROGRESS NOTE



SUBJECTIVE:  The patient has no complaints.  She feels better.  ASD

negative x3 now.



OBJECTIVE:

VITAL SIGNS:  Blood pressure 122/65, pulse 86, and respirations 20.

LUNGS:  Few rhonchi.

HEART:  Regular rhythm and rate.  Normal S1 and S2.

ABDOMEN:  Soft.

EXTREMITIES:  No edema.



IMPRESSION:  We will check room air oxygen saturation and discontinue

oxygen if adequate.  We will consider steroids per pulmonologist.

Isolation discontinued.  Discharge planning.  Following review of chest

radiograph.









  ______________________________________________

  Van Rocha M.D.





DR:  CARLOS

D:  03/26/2017 21:41

T:  03/27/2017 02:15

JOB#:  [id number]

CC:

## 2017-03-27 NOTE — DIAGNOSTIC IMAGING REPORT
Indication: Chest pain



Technique: One view of the chest



Comparison: 3/21/2017



Findings: There is increased bilateral parenchymal opacity, especially in the

perihilar regions. There is slight blunting of the costophrenic sulci, greater 

than

before, indicate small bilateral pleural effusions. The heart size is normal



Impression: Bilateral parenchymal disease, worsening 3/21/2017. Nonspecific but

likely representing pneumonia. Correlate with clinical findings

## 2017-03-27 NOTE — PULMONOLOGY PROGRESS NOTE
Assessment/Plan


Assessment/Plan


1. Bilateral pulmonary infiltrates, pneumonia with rheumatoid lung disease 


2. History of hypertension


3. Anemia.


4. Malnutrition 


5. Chronic kidney disease, stage 2.


6. Mild hyperglycemia.





doing well





RA titer +; Rheumatoid lung with pneumonia


TB unlikely


cont abx


prednisone 20 mg qd


dc planning per Dr Rocha


outpatient rheum consult





Subjective


Constitutional:  Reports: no symptoms


Allergies:  


Coded Allergies:  


     No Known Allergies (Unverified , 2/14/16)





Objective





Last 24 Hour Vital Signs








  Date Time  Temp Pulse Resp B/P Pulse Ox O2 Delivery O2 Flow Rate FiO2


 


3/27/17 04:00 99.7 87 20 111/61 92 Nasal Cannula 2.0 


 


3/27/17 03:15      Nasal Cannula 2.0 28


 


3/27/17 03:14      Nasal Cannula 2.0 28


 


3/27/17 02:30 99.5       


 


3/27/17 00:00 99.7 87 18 96/62 96 Nasal Cannula 2.0 


 


3/26/17 23:30      Nasal Cannula 2.0 28


 


3/26/17 23:30      Nasal Cannula 2.0 28


 


3/26/17 19:30      Nasal Cannula 2.0 28


 


3/26/17 19:30      Nasal Cannula 2.0 28


 


3/26/17 19:00 98.6 91 20 109/61 97 Nasal Cannula 2.0 


 


3/26/17 16:00 99.9 86 20 122/65 97 Nasal Cannula 2.0 


 


3/26/17 15:05      Nasal Cannula 2.0 28


 


3/26/17 15:05      Nasal Cannula 2.0 28


 


3/26/17 11:55 99.1 83 22 106/62 97 Room Air  


 


3/26/17 11:15      Nasal Cannula 2.0 28


 


3/26/17 11:15      Nasal Cannula 2.0 28


 


3/26/17 08:20 97.9 89 20 111/67 97 Room Air  

















Intake and Output  


 


 3/26/17 3/27/17





 19:00 07:00


 


Intake Total 240 ml 560 ml


 


Balance 240 ml 560 ml


 


  


 


Intake Oral 240 ml 560 ml


 


# Voids  6


 


# Bowel Movements  2








Respiratory/Chest:  crackles/rales





Microbiology








 Date/Time


Source Procedure


Growth Status


 


 


 3/25/17 01:15


Sputum Not Specified AFB Specimen Processing Tissue - Final Resulted





 3/25/17 01:15


Sputum Not Specified Acid Fast Bacilli Smear - Final Resulted


 


 3/25/17 01:15


Sputum Not Specified Acid Fast Bacilli Culture


Pending Resulted








Laboratory Tests


3/27/17 07:03: 


White Blood Count 5.9, Red Blood Count 2.71L, Hemoglobin 8.3L, Hematocrit 25.4L

, Mean Corpuscular Volume 93, Mean Corpuscular Hemoglobin 30.7, Mean 

Corpuscular Hemoglobin Concent 32.9, Red Cell Distribution Width 12.2, Platelet 

Count 303, Mean Platelet Volume 5.3L, Neutrophils (%) (Auto) 69.8, Lymphocytes (

%) (Auto) 19.3L, Monocytes (%) (Auto) 9.2, Eosinophils (%) (Auto) 1.4, 

Basophils (%) (Auto) 0.3, Sodium Level [Pending], Potassium Level [Pending], 

Chloride Level [Pending], Carbon Dioxide Level [Pending], Blood Urea Nitrogen [

Pending], Creatinine [Pending], Estimat Glomerular Filtration Rate [Pending], 

Glucose Level [Pending], Calcium Level [Pending]





Current Medications








 Medications


  (Trade)  Dose


 Ordered  Sig/Naman


 Route


 PRN Reason  Start Time


 Stop Time Status Last Admin


Dose Admin


 


 Acetaminophen


  (Tylenol)  650 mg  Q4H  PRN


 ORAL


 Mild Pain (Pain Scale 1-3)  3/23/17 21:45


 4/22/17 21:44  3/27/17 00:58


 


 


 Albuterol/


 Ipratropium


  (DuoNeb


 0.5-3(2.5)mg/3ml)  3 ml  Q4HRT


 HHN


   3/23/17 19:00


 3/28/17 18:59  3/27/17 07:23


 


 


 Azithromycin


  (Zithromax)  250 mg  Q24H


 ORAL


   3/24/17 18:00


 3/28/17 17:59  3/26/17 19:34


 


 


 Ceftriaxone


 Sodium/Dextrose


  (Rocephin/D5W)  55 ml @ 


 110 mls/hr  Q24H


 IVPB


   3/23/17 19:00


 3/29/17 18:59  3/26/17 19:34


 


 


 Dextrose


  (Dextrose 50%)    STAT  PRN


 IV


 Hypoglycemia  3/23/17 20:45


 4/22/17 20:44   


 


 


 Guaifenesin/


 Codeine Phosphate


  (Robitussin with


 codeine)  5 ml  Q4H  PRN


 ORAL


 For Cough  3/23/17 21:45


 4/22/17 21:44   


 


 


 Heparin Sodium


  (Porcine)


  (Heparin 5000


 units/ml)  5,000 units  EVERY 12  HOURS


 SUBQ


   3/23/17 21:00


 4/22/17 20:59  3/26/17 22:07


 

















BONNIE PINA Mar 27, 2017 08:18

## 2017-03-28 NOTE — PULMONOLOGY PROGRESS NOTE
Assessment/Plan


Assessment/Plan


1. Bilateral pulmonary infiltrates, pneumonia with rheumatoid lung disease 


2. History of hypertension


3. Anemia.


4. Malnutrition 


5. Chronic kidney disease, stage 2.


6. Mild hyperglycemia.





doing well


CXR lagging behind clinical improvement


DC planning


disc w Dr Rocha





RA titer +; Rheumatoid lung with pneumonia


cont abx


prednisone 20 mg qd


rheum consult





Subjective


Constitutional:  Reports: no symptoms, Denies: fever


Respiratory:  Denies: productive cough, shortness of breath


Allergies:  


Coded Allergies:  


     No Known Allergies (Unverified , 2/14/16)





Objective





Last 24 Hour Vital Signs








  Date Time  Temp Pulse Resp B/P Pulse Ox O2 Delivery O2 Flow Rate FiO2


 


3/28/17 11:27 97.7 102 18 97/57 95 Room Air  


 


3/28/17 10:57  90 16  98 Room Air  21


 


3/28/17 10:47  88 16  94 Room Air  21


 


3/28/17 08:23  104 18 89/48 93 Room Air  


 


3/28/17 07:39  85 16  97 Room Air  21


 


3/28/17 07:30  82 16  92 Room Air  


 


3/28/17 04:00 97.5 81 18 101/61 94 Room Air  


 


3/28/17 03:00      Nasal Cannula 2.0 28


 


3/28/17 03:00      Nasal Cannula 2.0 28


 


3/28/17 00:00 99.1 90 18 102/62 94 Nasal Cannula 2.0 


 


3/27/17 23:00  94 16  97 Nasal Cannula 2.0 28


 


3/27/17 23:00      Nasal Cannula 2.0 28


 


3/27/17 19:00      Nasal Cannula 2.0 28


 


3/27/17 19:00  96 16  97 Nasal Cannula 2.0 28


 


3/27/17 19:00 98.4 97 20 106/61 94 Nasal Cannula 2.0 


 


3/27/17 16:00 99.0 81 20 103/58 97 Nasal Cannula 2.0 


 


3/27/17 15:20      Nasal Cannula 2.0 28


 


3/27/17 15:20      Nasal Cannula 2.0 28

















Intake and Output  


 


 3/27/17 3/28/17





 19:00 07:00


 


Intake Total 960 ml 415 ml


 


Balance 960 ml 415 ml


 


  


 


Intake Oral 960 ml 360 ml


 


IV Total  55 ml


 


# Voids 2 6








General Appearance:  no acute distress


Respiratory/Chest:  crackles/rales


Cardiovascular:  normal rate





Current Medications








 Medications


  (Trade)  Dose


 Ordered  Sig/Naman


 Route


 PRN Reason  Start Time


 Stop Time Status Last Admin


Dose Admin


 


 Acetaminophen


  (Tylenol)  650 mg  Q4H  PRN


 ORAL


 Mild Pain (Pain Scale 1-3)  3/23/17 21:45


 4/22/17 21:44  3/27/17 00:58


 


 


 Albuterol/


 Ipratropium


  (DuoNeb


 0.5-3(2.5)mg/3ml)  3 ml  Q4HRT


 HHN


   3/23/17 19:00


 3/28/17 18:59  3/28/17 10:47


 


 


 Azithromycin


  (Zithromax)  250 mg  Q24H


 ORAL


   3/24/17 18:00


 3/28/17 17:59  3/27/17 18:45


 


 


 Ceftriaxone


 Sodium/Dextrose


  (Rocephin/D5W)  55 ml @ 


 110 mls/hr  Q24H


 IVPB


   3/23/17 19:00


 3/29/17 18:59  3/27/17 18:46


 


 


 Dextrose


  (Dextrose 50%)    STAT  PRN


 IV


 Hypoglycemia  3/23/17 20:45


 4/22/17 20:44   


 


 


 Guaifenesin/


 Codeine Phosphate


  (Robitussin with


 codeine)  5 ml  Q4H  PRN


 ORAL


 For Cough  3/23/17 21:45


 4/22/17 21:44   


 


 


 Heparin Sodium


  (Porcine)


  (Heparin 5000


 units/ml)  5,000 units  EVERY 12  HOURS


 SUBQ


   3/23/17 21:00


 4/22/17 20:59  3/28/17 08:12


 


 


 Prednisone


  (predniSONE)  20 mg  DAILY


 ORAL


   3/28/17 09:00


 4/27/17 08:59  3/28/17 08:10


 

















BONNIE PINA Mar 28, 2017 12:49

## 2017-03-28 NOTE — PROGRESS NOTE
DATE:  03/27/2017



CARDIOLOGY PROGRESS NOTE



SUBJECTIVE:  AFB culture is negative.  The patient still has

congestion, but less short of breath.  No hypoxia noted.  Chest x-ray

reveals worsening infiltrates.  The patient is being started on steroids

for presumed rheumatoid lung.  Vital signs are stable.  Clinical exam is

unchanged.  We will need to address further workup prior to discharge in

view of chest radiograph findings.  This will be discussed with

pulmonologist, and discharge plan to be finalized with outpatient

Rheumatology consult to be arranged.









  ______________________________________________

  Van Rocha M.D.





DR:  William

D:  03/28/2017 02:03

T:  03/28/2017 02:50

JOB#:  [id number]

CC:

## 2017-03-29 NOTE — PULMONOLOGY PROGRESS NOTE
Assessment/Plan


Assessment/Plan


1. Bilateral pulmonary infiltrates, pneumonia with rheumatoid lung disease 


2. History of hypertension


3. Anemia.


4. Malnutrition 


5. Chronic kidney disease, stage 2.


6. Mild hyperglycemia.





doing well


CXR lagging behind clinical improvement


DC planning


I [facility]ed[facility] and she agreed the patient can 

safely be discharged


disc w Dr Rocha





RA titer +; Rheumatoid lung with pneumonia


cont abx


prednisone 20 mg qd


rheum consult after dc





Subjective


Constitutional:  Reports: no symptoms


Allergies:  


Coded Allergies:  


     No Known Allergies (Unverified , 2/14/16)





Objective





Last 24 Hour Vital Signs








  Date Time  Temp Pulse Resp B/P Pulse Ox O2 Delivery O2 Flow Rate FiO2


 


3/29/17 08:08 97.4 74 21 111/61 97 Room Air  


 


3/29/17 04:30 97.7 74 18 112/67 97 Room Air  


 


3/29/17 00:30 97.0 76 18 114/72 95 Room Air  


 


3/28/17 20:30  81 18   Room Air  


 


3/28/17 19:00 98.0 84 18 105/56 94 Room Air  


 


3/28/17 16:00 97.5 96 20 94/53 95 Room Air  


 


3/28/17 15:15  88 16  98 Room Air  21


 


3/28/17 15:03  90 16  94 Room Air  21

















Intake and Output  


 


 3/28/17 3/29/17





 19:00 07:00


 


Intake Total 900 ml 470 ml


 


Balance 900 ml 470 ml


 


  


 


Intake Oral 900 ml 360 ml


 


IV Total  110 ml


 


# Voids 2 5


 


# Bowel Movements  1








Respiratory/Chest:  crackles/rales


Laboratory Tests


3/28/17 18:15: 


Hepatitis A IgM Antibody [Pending], Hepatitis B Surface Antigen [Pending], 

Hepatitis B Core IgM Antibody [Pending], Hepatitis C Antibody [Pending], HIV (1&

2) Antibody Rapid Negative


3/29/17 08:00: 


White Blood Count 5.1, Red Blood Count 2.84L, Hemoglobin 8.5L, Hematocrit 26.5L

, Mean Corpuscular Volume 93, Mean Corpuscular Hemoglobin 29.9, Mean 

Corpuscular Hemoglobin Concent 32.0, Red Cell Distribution Width 12.2, Platelet 

Count 390, Mean Platelet Volume 5.4L, Neutrophils (%) (Auto) 62.3, Lymphocytes (

%) (Auto) 28.3, Monocytes (%) (Auto) 8.0, Eosinophils (%) (Auto) 1.0, Basophils 

(%) (Auto) 0.4, Sodium Level 141, Potassium Level 3.5, Chloride Level 102, 

Carbon Dioxide Level 24, Anion Gap 15, Blood Urea Nitrogen 16, Creatinine 0.9, 

Estimat Glomerular Filtration Rate , Glucose Level 117H, Calcium Level 9.1, 

Magnesium Level 2.0, Total Bilirubin 0.3, Aspartate Amino Transf (AST/SGOT) 36, 

Alanine Aminotransferase (ALT/SGPT) 21, Alkaline Phosphatase 106H, Pro-B-Type 

Natriuretic Peptide 1580H, Total Protein 7.5, Albumin 2.8L, Globulin 4.7, 

Albumin/Globulin Ratio 0.5L





Current Medications








 Medications


  (Trade)  Dose


 Ordered  Sig/Naman


 Route


 PRN Reason  Start Time


 Stop Time Status Last Admin


Dose Admin


 


 Acetaminophen


  (Tylenol)  650 mg  Q4H  PRN


 ORAL


 Mild Pain (Pain Scale 1-3)  3/23/17 21:45


 4/22/17 21:44  3/27/17 00:58


 


 


 Azithromycin


  (Zithromax)  250 mg  Q24H


 ORAL


   3/24/17 18:00


 3/31/17 23:59  3/28/17 18:33


 


 


 Dextrose


  (Dextrose 50%)    STAT  PRN


 IV


 Hypoglycemia  3/23/17 20:45


 4/22/17 20:44   


 


 


 Guaifenesin/


 Codeine Phosphate


  (Robitussin with


 codeine)  5 ml  Q4H  PRN


 ORAL


 For Cough  3/23/17 21:45


 4/22/17 21:44   


 


 


 Heparin Sodium


  (Porcine)


  (Heparin 5000


 units/ml)  5,000 units  EVERY 12  HOURS


 SUBQ


   3/23/17 21:00


 4/22/17 20:59  3/29/17 08:02


 


 


 Prednisone


  (predniSONE)  20 mg  DAILY


 ORAL


   3/28/17 09:00


 4/27/17 08:59  3/29/17 08:00


 

















BONNIE PINA Mar 29, 2017 11:57

## 2017-03-29 NOTE — PROGRESS NOTE
DATE:  03/28/2017



INTERNAL MEDICINE PROGRESS NOTE



SUBJECTIVE:  The case was discussed with Dr. Urias.  The patient was

seen and evaluated.



The patient has no chest pain.  No shortness of breath.



OBJECTIVE:

VITAL SIGNS:  She is oxygenating at 94% to 98% on room air.  Blood

pressure is 105/56, pulse 84, and respiratory rate 18.

CHEST:  Exam with few rhonchi.

NECK:  Supple.

LUNGS:  Lungs with few rhonchi.

CARDIAC:  Regular.  Normal S1 and S2.

ABDOMEN:  Soft.

EXTREMITIES:  No edema.



LABORATORY DATA:  Laboratories from 03/27/2017 were noted.



IMPRESSION:  Rheumatoid lung.  Chest x-ray appears worsening,

however, clinically the patient is significantly improved.  No signs of

tuberculosis.



PLAN:  The patient is stable for outpatient followup on oral steroids

with Rheumatology setup as well.  She will have close monitoring of her

pulmonary and metabolic parameters in that manner.  She does not need home

oxygen and despite her x-ray findings is without any symptoms at this

time, and usually as discussed with Dr. Urias chest x-ray response is

expected to lack clinical finding.  We will discharge as soon as

[facility]'s_____ approval is obtained.









  ______________________________________________

  Van Rocha M.D. DR:  Ludmila

D:  03/29/2017 02:37

T:  03/29/2017 08:01

JOB#:  [id number]

CC:

## 2017-03-29 NOTE — DIAGNOSTIC IMAGING REPORT
Indication: SOB



Technique: One view of the chest



Comparison: 3/27/2017



Findings: Bilateral interstitial and alveolar infiltrates, predominantly perihilar,

are stable to slightly improved, allowing for technical differences. No 

definite

effusions. Heart size is normal



Impression: Stable or slightly improved, still extensive, bilateral interstitial 

and

alveolar infiltrates versus edema, over 2 days

## 2017-03-30 NOTE — DISCHARGE SUMMARY
Discharge Summary


Hospital Course


Date of Admission


Mar 21, 2017 at 15:25


Date of Discharge


Mar 29, 2017 at 17:51


Admitting Diagnosis


pneumonitis


HPI


Nahomy Sampson is a 72 year old female who was admitted on Mar 21, 2017 at 15:25 

for Pneumonitis


Hospital Course


[id number]





Discharge


Discharge Disposition


Patient was discharged to Home (01)


Discharge Diagnoses:  











Marilynn Harper NP Mar 30, 2017 16:57

## 2017-03-31 NOTE — DISCHARGE SUMMARY 2 SIG
DATE OF ADMISSION:  03/21/2017



DATE OF DISCHARGE:  03/29/2017



CONSULTANT:  Juancarlos Urias M.D.



BRIEF HOSPITAL COURSE:  The patient is a 72-year-old  female,

who presented to the emergency room with several weeks of progressive

cough with congestion and shortness of breath that has been ongoing for

the past few days despite oral antibiotics.  The patient took a course of

antibiotic almost two months ago and did not improve as she developed

fever and chills.  In the last couple of days, she was given additional

antibiotics, however, symptoms continued.  There was no history of recent

travel.  No history of asthma, COPD, or any lung disease.  Chest x-ray on

evaluation revealed interstitial infiltrates.  Vital signs show fever of

100.6 degrees.  EKG was sinus rhythm with no acute abnormalities.  Dr. Urias was consulted.  The patient was placed on respiratory isolation.

CAT scan of the chest showed extensive infiltrates with pneumatoceles.

Sputum AFB and serology for tuberculosis was sent. Rheumatoid arthritis

titer was positive.  AFB sputum negative.  TB spot and DNA are pending.

The patient with probable rheumatoid lung with pneumonia.  She was given

prednisone 20 mg daily, and was advised outpatient follow up with

Rheumatology.  Subsequent chest x-ray showed worsening of infiltrates,

however, clinically the patient was significantly improved.  No signs of

tuberculosis.  The patient is stable for outpatient followup on oral

steroids as well as Rheumatology follow up.  She does not need home oxygen

despite x-ray findings.  [facility] clearance was obtained.  The patient was

eventually discharged home.



FINAL DIAGNOSES:

1. Bilateral pulmonary infiltrates, with pneumonia with rheumatoid lung

disease.

2. History of hypertension.

3. Anemia.

4. Severe protein-calorie malnutrition.

5. Chronic kidney disease stage 2.

6. Mild hyperglycemia.

7. Chronic kidney disease.









  ______________________________________________

  Van Rocha M.D.



I have been assigned to dictate discharge summary on this account and I

was not involved in the patient's management.



  ______________________________________________

  Marilynn Harper N.P.





DR:  Ashwin

D:  03/30/2017 16:57

T:  03/31/2017 02:02

JOB#:  [id number]

CC:



JEFFREY